# Patient Record
Sex: MALE | Race: WHITE | Employment: FULL TIME | URBAN - METROPOLITAN AREA
[De-identification: names, ages, dates, MRNs, and addresses within clinical notes are randomized per-mention and may not be internally consistent; named-entity substitution may affect disease eponyms.]

---

## 2019-04-08 ENCOUNTER — HOSPITAL ENCOUNTER (EMERGENCY)
Age: 29
Discharge: HOME | End: 2019-04-08
Attending: EMERGENCY MEDICINE
Payer: COMMERCIAL

## 2019-04-08 VITALS
SYSTOLIC BLOOD PRESSURE: 152 MMHG | RESPIRATION RATE: 16 BRPM | DIASTOLIC BLOOD PRESSURE: 80 MMHG | OXYGEN SATURATION: 100 % | TEMPERATURE: 98.2 F | HEART RATE: 83 BPM

## 2019-04-08 DIAGNOSIS — R10.33 PERIUMBILICAL ABDOMINAL PAIN: Primary | ICD-10-CM

## 2019-04-08 LAB
ALBUMIN SERPL-MCNC: 4.4 G/DL (ref 3.4–5)
ALP BLD-CCNC: 85 U/L (ref 40–129)
ALT SERPL-CCNC: 45 U/L (ref 10–40)
ANION GAP SERPL CALCULATED.3IONS-SCNC: 12 MMOL/L (ref 3–16)
AST SERPL-CCNC: 33 U/L (ref 15–37)
BASOPHILS ABSOLUTE: 0 K/UL (ref 0–0.2)
BASOPHILS RELATIVE PERCENT: 0.2 %
BILIRUB SERPL-MCNC: <0.2 MG/DL (ref 0–1)
BILIRUBIN DIRECT: <0.2 MG/DL (ref 0–0.3)
BILIRUBIN, INDIRECT: ABNORMAL MG/DL (ref 0–1)
BUN BLDV-MCNC: 10 MG/DL (ref 7–20)
CALCIUM SERPL-MCNC: 9.3 MG/DL (ref 8.3–10.6)
CHLORIDE BLD-SCNC: 103 MMOL/L (ref 99–110)
CO2: 26 MMOL/L (ref 21–32)
CREAT SERPL-MCNC: 0.8 MG/DL (ref 0.9–1.3)
EOSINOPHILS ABSOLUTE: 0.2 K/UL (ref 0–0.6)
EOSINOPHILS RELATIVE PERCENT: 1.5 %
GFR AFRICAN AMERICAN: >60
GFR NON-AFRICAN AMERICAN: >60
GLUCOSE BLD-MCNC: 89 MG/DL (ref 70–99)
HCT VFR BLD CALC: 43.4 % (ref 40.5–52.5)
HEMOGLOBIN: 14.8 G/DL (ref 13.5–17.5)
LIPASE: 15 U/L (ref 13–60)
LYMPHOCYTES ABSOLUTE: 2 K/UL (ref 1–5.1)
LYMPHOCYTES RELATIVE PERCENT: 19 %
MCH RBC QN AUTO: 27.5 PG (ref 26–34)
MCHC RBC AUTO-ENTMCNC: 34 G/DL (ref 31–36)
MCV RBC AUTO: 81 FL (ref 80–100)
MONOCYTES ABSOLUTE: 0.8 K/UL (ref 0–1.3)
MONOCYTES RELATIVE PERCENT: 8 %
NEUTROPHILS ABSOLUTE: 7.4 K/UL (ref 1.7–7.7)
NEUTROPHILS RELATIVE PERCENT: 71.3 %
PDW BLD-RTO: 13.1 % (ref 12.4–15.4)
PLATELET # BLD: 187 K/UL (ref 135–450)
PMV BLD AUTO: 8.4 FL (ref 5–10.5)
POTASSIUM REFLEX MAGNESIUM: 3.6 MMOL/L (ref 3.5–5.1)
RBC # BLD: 5.36 M/UL (ref 4.2–5.9)
SODIUM BLD-SCNC: 141 MMOL/L (ref 136–145)
TOTAL PROTEIN: 7.1 G/DL (ref 6.4–8.2)
WBC # BLD: 10.4 K/UL (ref 4–11)

## 2019-04-08 PROCEDURE — 99283 EMERGENCY DEPT VISIT LOW MDM: CPT

## 2019-04-08 PROCEDURE — 85025 COMPLETE CBC W/AUTO DIFF WBC: CPT

## 2019-04-08 PROCEDURE — 80048 BASIC METABOLIC PNL TOTAL CA: CPT

## 2019-04-08 PROCEDURE — 83690 ASSAY OF LIPASE: CPT

## 2019-04-08 PROCEDURE — 80076 HEPATIC FUNCTION PANEL: CPT

## 2019-04-08 PROCEDURE — 6370000000 HC RX 637 (ALT 250 FOR IP): Performed by: EMERGENCY MEDICINE

## 2019-04-08 RX ORDER — ACETAMINOPHEN 500 MG
1000 TABLET ORAL ONCE
Status: COMPLETED | OUTPATIENT
Start: 2019-04-08 | End: 2019-04-08

## 2019-04-08 RX ADMIN — ACETAMINOPHEN 1000 MG: 500 TABLET ORAL at 17:42

## 2019-04-08 ASSESSMENT — PAIN SCALES - GENERAL
PAINLEVEL_OUTOF10: 5
PAINLEVEL_OUTOF10: 5

## 2019-04-08 ASSESSMENT — PAIN DESCRIPTION - PAIN TYPE: TYPE: ACUTE PAIN

## 2019-04-08 NOTE — ED PROVIDER NOTES
Date of evaluation: 4/8/2019    Chief Complaint   Abdominal Pain (started last night)    Nursing Notes, Past Medical Hx, Past Surgical Hx, Social Hx, Allergies, and Family Hx were reviewed. History of Present Illness     Mahnaz Selby is a 34 y.o. male with a history as noted below presenting with abdominal pain. History is obtained from the patient. The patient presents to the ED for abdominal pain. He reports that this began alst evening, and got worse around 3:30am or so when it woke him from his sleep. It seemed to be primarily esau-umbilcal but also more on the right side than the left. It is an aching pain. He reports doing a lot of ab exercises yesterday but this does not feel like that kind of usual soreness. He has had no nausea or vomiting. No diarrhea or constipation. Last BM yesterday was normal without blood. No fever. No prior abdominal surgeries. He actually feels that overall, the pain is getting better. He reports he does have an appetite and is in fact hungry, but he went to urgent care today and they told him to come here and not eat or drink anything because he may have appendicitis. No concerns for STD. No dysuria. Works as a  in [employer]. With the exception of the above, there are no further alleviating or exacerbating factors, and no other associated symptoms. Review of Systems     The patient reports abdominal pain as above. Denies fever, chills, headache, neck pain, chest pain, trouble breathing, nausea, vomiting, diarrhea. All other review of systems reviewed and were negative except as mentioned in HPI. Past Medical, Surgical, Family, and Social History     No past medical history on file. No past surgical history on file. Hisfamily history is not on file. He     Medications     There are no discharge medications for this patient. Allergies     He has No Known Allergies.     Physical Exam     INITIAL VITALS: BP (!) 152/80   Pulse 83 ULTRASOUND:  No ED ultrasound performed. This procedure was performed in the presence of the Emergency Medicine Attending. RECENT VITALS:  BP: (!) 152/80,Temp: 98.2 °F (36.8 °C), Pulse: 83, Resp: 16     Procedures     Procedures  No EDprocedure performed. Medical Decision Making and ED Course     Vaughn Santo is a 34 y.o. male with a history and presentation as described above in the HPI. Thepatient was evaluated by myself and the ED attending physician, Dr. Ata Taylor. All management and disposition plans were discussed and agreed upon. On initial presentation, this patient was well appearing, in no apparent distress, afebrile, normotensive, with vitals otherwise unremarkable. Based on history and examination, I am not concerned for appendicitis. His exam is not concerning for an appy, he has an appetite and wants to eat, and no associated symptoms. He took tylenol and was able to tolerate PO. He has no white count elevation. I discussed my incredibly low suspicion for appendicitis with him. I discussed that if this was appendicitis, that it is likely very early and could potentially be missed on CT. I discussed very strict return precautions for worsening pain or development of associated symptoms. The patient was agreeable with this plan. Clinical Impression     1. Periumbilical abdominal pain      Plan     At this time the patient has been deemed safe for discharge. All labs, imaging, and other tests were reviewed. The patient understoodtheir test results, diagnosis, and management plan. All questions asked by the patient were addressed. My customary discharge instructions including strict return precautions for worsening or new symptoms have beencommunicated.     The patient was given the following medications:  Orders Placed This Encounter   Medications    acetaminophen (TYLENOL) tablet 1,000 mg       CONSULTS:  None    PATIENT REFERRED TO:  No follow-up provider specified. DISCHARGE MEDICATIONS:  There are no discharge medications for this patient.        Janita Buerger, MD  Resident  04/09/19 [id number]